# Patient Record
(demographics unavailable — no encounter records)

---

## 2025-02-10 NOTE — REASON FOR VISIT
[Consultation] : a consultation visit [FreeTextEntry1] : Acid reflux, Heart Burn, and Abdominal Pain, Screening Colonoscopy

## 2025-02-10 NOTE — HISTORY OF PRESENT ILLNESS
[FreeTextEntry1] : 47 year old female presents with complaints of Acid reflux, Heart Burn, and Abdominal Pain, Screening Colonoscopy. Patient states she has been suffering with acid reflux for a while was started on Omeprazole by PCP she is unsure of the dose given she does experience minimal relief. She has a hx of anemia due to heavy menses and possible uterine fibroids. She has never had a colonoscopy. No family hx of colon cancer. Bowel movements are daily and regular without difficulty or strain. Denies rectal bleeding, blood in the stool, melena, or hematemesis. She says when she turned 40 she had a episode of diverticulitis and was treated in the ER with antibiotics. Patient believes she has a hernia possible hiatal and umbilical, she believes hernia developed 26 years ago after the birth of her child. She has x2 vaginal births. PCP Renu Kaplan referred patient for screening colonoscopy. PCP located at [phone] [place] [address]. Patient had blood work with PCP few weeks ago will call to obtain.

## 2025-02-10 NOTE — ASSESSMENT
[FreeTextEntry1] : A low acid / reflux diet was discussed in great detail including not smoking, not drinking alcohol, and not consuming foods that irritate the esophagus. It is helpful to eat small meals throughout the day instead of large meals. You should avoid eating before bedtime or lying down after you eat. It can be helpful to raise the head of your bed six inches. Additionally, you should maintain a healthy weight and good posture.. The patient was given written material to take home and review.  She will continue to take omeprazole daily and call office with correct dosage  EGD and Screening Colonoscopy procedure ordered The risks benefits alternatives and complications of the procedure/s were explained to the patient at length. The patient was agreeable and we will proceed. Preparation for procedure discussed reviewed side effects and adverse reactions to prep.   Abdominal Sonogram and Pelvic Sonogram ordered   She has an up coming appointment with GYN.  I spent 45 minutes with the patient as well as reviewing documents prior to and after the office visit  Patient verbalized understanding of all information provided. All questions answered and reviewed.